# Patient Record
Sex: FEMALE | Race: BLACK OR AFRICAN AMERICAN | NOT HISPANIC OR LATINO | URBAN - METROPOLITAN AREA
[De-identification: names, ages, dates, MRNs, and addresses within clinical notes are randomized per-mention and may not be internally consistent; named-entity substitution may affect disease eponyms.]

---

## 2020-07-25 ENCOUNTER — TELEPHONE ENCOUNTER (OUTPATIENT)
Dept: URBAN - METROPOLITAN AREA CLINIC 13 | Facility: CLINIC | Age: 50
End: 2020-07-25

## 2020-07-26 ENCOUNTER — TELEPHONE ENCOUNTER (OUTPATIENT)
Dept: URBAN - METROPOLITAN AREA CLINIC 13 | Facility: CLINIC | Age: 50
End: 2020-07-26

## 2020-07-26 RX ORDER — METFORMIN HYDROCHLORIDE 500 MG/1
TABLET, COATED ORAL
Qty: 120 | Refills: 0 | Status: ACTIVE | COMMUNITY
Start: 2019-01-07

## 2020-07-26 RX ORDER — NYSTATIN 100000 [USP'U]/ML
SWISH AND SWALLOW 10 TO 15 ML PO QID PRN SUSPENSION ORAL
Qty: 450 | Refills: 0 | Status: ACTIVE | COMMUNITY
Start: 2018-01-29

## 2020-07-26 RX ORDER — HYDROCODONE BITARTRATE AND ACETAMINOPHEN 5; 325 MG/1; MG/1
TAKE 1 TABLET BY MOUTH EVERY 6 HOURS AS NEEDED FOR PAIN TABLET ORAL
Qty: 20 | Refills: 0 | Status: ACTIVE | COMMUNITY
Start: 2018-07-27

## 2020-07-26 RX ORDER — VENLAFAXINE HYDROCHLORIDE 37.5 MG/1
CAPSULE, EXTENDED RELEASE ORAL
Qty: 30 | Refills: 0 | Status: ACTIVE | COMMUNITY
Start: 2013-11-01

## 2020-07-26 RX ORDER — HYDROCHLOROTHIAZIDE 25 MG/1
TABLET ORAL
Qty: 90 | Refills: 0 | Status: ACTIVE | COMMUNITY
Start: 2013-01-29

## 2020-07-26 RX ORDER — HYDROCHLOROTHIAZIDE 25 MG/1
TAKE 1 TABLET BY MOUTH DAILY TABLET ORAL
Qty: 90 | Refills: 0 | Status: ACTIVE | COMMUNITY
Start: 2018-06-22

## 2020-07-26 RX ORDER — ONDANSETRON HYDROCHLORIDE 4 MG/1
TK 1 T PO EVERY 8 HOURS PRN FOR NAUSEA TABLET, FILM COATED ORAL
Qty: 20 | Refills: 0 | Status: ACTIVE | COMMUNITY
Start: 2018-11-28

## 2020-07-26 RX ORDER — CYCLOBENZAPRINE HYDROCHLORIDE 5 MG/1
TAKE 1 TABLET BY MOUTH TWICE DAILY, AS NEEDED TABLET, FILM COATED ORAL
Qty: 28 | Refills: 0 | Status: ACTIVE | COMMUNITY
Start: 2018-07-23

## 2020-07-26 RX ORDER — BUPROPION HYDROCHLORIDE 300 MG/1
TABLET, EXTENDED RELEASE ORAL
Qty: 30 | Refills: 0 | Status: ACTIVE | COMMUNITY
Start: 2018-09-17

## 2020-07-26 RX ORDER — METFORMIN HYDROCHLORIDE 500 MG/1
TABLET, EXTENDED RELEASE ORAL
Qty: 255 | Refills: 0 | Status: ACTIVE | COMMUNITY
Start: 2012-07-20

## 2020-07-26 RX ORDER — OMEPRAZOLE 40 MG/1
TAKE 1 TABLET DAILY CAPSULE, DELAYED RELEASE ORAL
Qty: 30 | Refills: 11 | Status: ACTIVE | COMMUNITY
Start: 2018-06-22

## 2020-07-26 RX ORDER — BUPROPION HYDROCHLORIDE 150 MG/1
TK 1 T PO QAM FOR 7 DAYS TABLET, FILM COATED, EXTENDED RELEASE ORAL
Qty: 7 | Refills: 0 | Status: ACTIVE | COMMUNITY
Start: 2018-09-17

## 2020-07-26 RX ORDER — VENLAFAXINE HYDROCHLORIDE 75 MG/1
CAPSULE, EXTENDED RELEASE ORAL
Qty: 30 | Refills: 0 | Status: ACTIVE | COMMUNITY
Start: 2013-11-14

## 2020-07-26 RX ORDER — BUPROPION HYDROCHLORIDE 150 MG/1
TABLET, EXTENDED RELEASE ORAL
Qty: 90 | Refills: 0 | Status: ACTIVE | COMMUNITY
Start: 2013-01-29

## 2020-07-26 RX ORDER — POLYETHYLENE GLYCOL 3350, SODIUM CHLORIDE, SODIUM BICARBONATE AND POTASSIUM CHLORIDE WITH LEMON FLAVOR 420; 11.2; 5.72; 1.48 G/4L; G/4L; G/4L; G/4L
USE AS DIRECTED POWDER, FOR SOLUTION ORAL
Qty: 1 | Refills: 0 | Status: ACTIVE | COMMUNITY
Start: 2019-01-17

## 2020-07-26 RX ORDER — METFORMIN HYDROCHLORIDE 1000 MG/1
TAKE 1 TABLET EVERY 12 HOURS DAILY TABLET, COATED ORAL
Refills: 0 | Status: ACTIVE | COMMUNITY
Start: 2018-08-01

## 2020-07-26 RX ORDER — FLUCONAZOLE 150 MG/1
TABLET ORAL
Qty: 2 | Refills: 0 | Status: ACTIVE | COMMUNITY
Start: 2013-08-29

## 2020-07-26 RX ORDER — CEFUROXIME AXETIL 500 MG/1
TABLET, FILM COATED ORAL
Qty: 20 | Refills: 0 | Status: ACTIVE | COMMUNITY
Start: 2013-10-16

## 2020-07-26 RX ORDER — GABAPENTIN 300 MG/1
TAKE 2 CAPSULES BY MOUTH EVERY MORNING AND 2 CAPSULES AT BEDTIME CAPSULE ORAL
Qty: 360 | Refills: 0 | Status: ACTIVE | COMMUNITY
Start: 2018-05-08

## 2020-07-26 RX ORDER — CYCLOBENZAPRINE HYDROCHLORIDE 10 MG/1
TABLET, FILM COATED ORAL
Qty: 30 | Refills: 0 | Status: ACTIVE | COMMUNITY
Start: 2019-01-17

## 2020-07-26 RX ORDER — BENZONATATE 200 MG/1
CAPSULE ORAL
Qty: 30 | Refills: 0 | Status: ACTIVE | COMMUNITY
Start: 2013-10-16

## 2020-07-26 RX ORDER — LAMOTRIGINE 100 MG/1
TAKE 1 TABLET DAILY TABLET ORAL
Refills: 0 | Status: ACTIVE | COMMUNITY
Start: 2018-03-08

## 2020-07-26 RX ORDER — OMEPRAZOLE 20 MG/1
CAPSULE, DELAYED RELEASE ORAL
Qty: 180 | Refills: 0 | Status: ACTIVE | COMMUNITY
Start: 2019-01-12

## 2020-07-26 RX ORDER — PREDNISONE 20 MG/1
TAKE 3 TABLETS BY MOUTH EVERY DAY FOR 5 DAYS TABLET ORAL
Qty: 15 | Refills: 0 | Status: ACTIVE | COMMUNITY
Start: 2018-07-27

## 2020-07-26 RX ORDER — AZITHROMYCIN DIHYDRATE 250 MG/1
TAKE 2 TABLETS BY MOUTH TODAY, THEN TAKE 1 TABLET DAILY FOR 4 DAYS TABLET, FILM COATED ORAL
Qty: 6 | Refills: 0 | Status: ACTIVE | COMMUNITY
Start: 2018-06-25

## 2020-07-26 RX ORDER — ESCITALOPRAM 20 MG/1
TABLET, FILM COATED ORAL
Qty: 90 | Refills: 0 | Status: ACTIVE | COMMUNITY
Start: 2013-03-14

## 2020-07-26 RX ORDER — CLINDAMYCIN HYDROCHLORIDE 300 MG/1
TK 1 C PO TID CAPSULE ORAL
Qty: 21 | Refills: 0 | Status: ACTIVE | COMMUNITY
Start: 2018-11-28

## 2020-07-26 RX ORDER — IBUPROFEN 800 MG/1
TAKE 1 TABLET BY MOUTH THREE TIMES A DAY TABLET ORAL
Qty: 21 | Refills: 0 | Status: ACTIVE | COMMUNITY
Start: 2018-07-27

## 2020-07-26 RX ORDER — LISDEXAMFETAMINE DIMESYLATE 70 MG/1
TAKE 1 CAPSULE DAILY IN THE MORNING CAPSULE ORAL
Refills: 0 | Status: ACTIVE | COMMUNITY
Start: 2018-03-08

## 2020-07-26 RX ORDER — GABAPENTIN 300 MG/1
TAKE 2 CAPSULES BY MOUTH EVERY MORNING AND 2 CAPSULES EVERY NIGHT AT B CAPSULE ORAL
Qty: 120 | Refills: 0 | Status: ACTIVE | COMMUNITY
Start: 2018-04-09

## 2020-07-26 RX ORDER — LORAZEPAM 1 MG/1
TABLET ORAL
Qty: 30 | Refills: 0 | Status: ACTIVE | COMMUNITY
Start: 2018-09-17

## 2020-07-26 RX ORDER — CYCLOBENZAPRINE HYDROCHLORIDE 10 MG/1
TAKE 1 TABLET BY MOUTH THREE TIMES A DAY AS NEEDED FOR MUSCLE SPASMS TABLET, FILM COATED ORAL
Qty: 30 | Refills: 0 | Status: ACTIVE | COMMUNITY
Start: 2018-10-16

## 2020-07-26 RX ORDER — DULOXETINE 60 MG/1
TAKE 1 CAPSULE DAILY CAPSULE, DELAYED RELEASE PELLETS ORAL
Refills: 0 | Status: ACTIVE | COMMUNITY
Start: 2018-04-04

## 2020-07-26 RX ORDER — OXYCODONE AND ACETAMINOPHEN 5; 325 MG/1; MG/1
TK 1 TS PO Q 6 H PRN P TABLET ORAL
Qty: 30 | Refills: 0 | Status: ACTIVE | COMMUNITY
Start: 2018-11-28

## 2020-07-26 RX ORDER — METFORMIN HYDROCHLORIDE 500 MG/1
TABLET, COATED ORAL
Qty: 360 | Refills: 0 | Status: ACTIVE | COMMUNITY
Start: 2019-02-13

## 2020-07-26 RX ORDER — DOCUSATE SODIUM 100 MG/1
TK 1 C PO BID PRN CAPSULE, LIQUID FILLED ORAL
Qty: 20 | Refills: 0 | Status: ACTIVE | COMMUNITY
Start: 2018-11-28

## 2020-07-26 RX ORDER — OMEPRAZOLE 20 MG/1
CAPSULE, DELAYED RELEASE ORAL
Qty: 180 | Refills: 0 | Status: ACTIVE | COMMUNITY
Start: 2012-07-20

## 2023-08-15 ENCOUNTER — OFFICE VISIT (OUTPATIENT)
Dept: URBAN - METROPOLITAN AREA CLINIC 113 | Facility: CLINIC | Age: 53
End: 2023-08-15
Payer: COMMERCIAL

## 2023-08-15 ENCOUNTER — LAB OUTSIDE AN ENCOUNTER (OUTPATIENT)
Dept: URBAN - METROPOLITAN AREA CLINIC 113 | Facility: CLINIC | Age: 53
End: 2023-08-15

## 2023-08-15 VITALS
RESPIRATION RATE: 20 BRPM | HEART RATE: 99 BPM | WEIGHT: 247.6 LBS | BODY MASS INDEX: 37.53 KG/M2 | HEIGHT: 68 IN | SYSTOLIC BLOOD PRESSURE: 131 MMHG | DIASTOLIC BLOOD PRESSURE: 94 MMHG | TEMPERATURE: 97.3 F

## 2023-08-15 DIAGNOSIS — K59.01 CONSTIPATION BY DELAYED COLONIC TRANSIT: ICD-10-CM

## 2023-08-15 DIAGNOSIS — R13.19 ESOPHAGEAL DYSPHAGIA: ICD-10-CM

## 2023-08-15 DIAGNOSIS — R63.0 ANOREXIA: ICD-10-CM

## 2023-08-15 PROBLEM — 79890006: Status: ACTIVE | Noted: 2023-08-15

## 2023-08-15 PROBLEM — 35298007: Status: ACTIVE | Noted: 2023-08-15

## 2023-08-15 PROCEDURE — 99204 OFFICE O/P NEW MOD 45 MIN: CPT | Performed by: NURSE PRACTITIONER

## 2023-08-15 RX ORDER — CYCLOBENZAPRINE HYDROCHLORIDE 10 MG/1
TAKE 1 TABLET BY MOUTH THREE TIMES A DAY AS NEEDED FOR MUSCLE SPASMS TABLET, FILM COATED ORAL
Qty: 30 | Refills: 0 | Status: ON HOLD | COMMUNITY
Start: 2018-10-16

## 2023-08-15 RX ORDER — BENZONATATE 200 MG/1
CAPSULE ORAL
Qty: 30 | Refills: 0 | Status: ON HOLD | COMMUNITY
Start: 2013-10-16

## 2023-08-15 RX ORDER — LISDEXAMFETAMINE DIMESYLATE 70 MG/1
TAKE 1 CAPSULE DAILY IN THE MORNING CAPSULE ORAL
Refills: 0 | Status: ON HOLD | COMMUNITY
Start: 2018-03-08

## 2023-08-15 RX ORDER — DOCUSATE SODIUM 100 MG/1
TK 1 C PO BID PRN CAPSULE, LIQUID FILLED ORAL
Qty: 20 | Refills: 0 | Status: ON HOLD | COMMUNITY
Start: 2018-11-28

## 2023-08-15 RX ORDER — ATOMOXETINE 40 MG/1
1 CAPSULE IN THE MORNING CAPSULE ORAL ONCE A DAY
Status: ACTIVE | COMMUNITY

## 2023-08-15 RX ORDER — DULOXETINE 60 MG/1
TAKE 1 CAPSULE DAILY CAPSULE, DELAYED RELEASE PELLETS ORAL
Refills: 0 | Status: ON HOLD | COMMUNITY
Start: 2018-04-04

## 2023-08-15 RX ORDER — GABAPENTIN 300 MG/1
TAKE 2 CAPSULES BY MOUTH EVERY MORNING AND 2 CAPSULES EVERY NIGHT AT B CAPSULE ORAL
Qty: 120 | Refills: 0 | Status: ON HOLD | COMMUNITY
Start: 2018-04-09

## 2023-08-15 RX ORDER — CYCLOBENZAPRINE HYDROCHLORIDE 5 MG/1
TAKE 1 TABLET BY MOUTH TWICE DAILY, AS NEEDED TABLET, FILM COATED ORAL
Qty: 28 | Refills: 0 | Status: ON HOLD | COMMUNITY
Start: 2018-07-23

## 2023-08-15 RX ORDER — POLYETHYLENE GLYCOL 3350, SODIUM CHLORIDE, SODIUM BICARBONATE AND POTASSIUM CHLORIDE WITH LEMON FLAVOR 420; 11.2; 5.72; 1.48 G/4L; G/4L; G/4L; G/4L
USE AS DIRECTED POWDER, FOR SOLUTION ORAL
Qty: 1 | Refills: 0 | Status: ON HOLD | COMMUNITY
Start: 2019-01-17

## 2023-08-15 RX ORDER — BUPROPION HYDROCHLORIDE 150 MG/1
TABLET, EXTENDED RELEASE ORAL
Qty: 90 | Refills: 0 | Status: ACTIVE | COMMUNITY
Start: 2013-01-29

## 2023-08-15 RX ORDER — METFORMIN HYDROCHLORIDE 1000 MG/1
TAKE 1 TABLET EVERY 12 HOURS DAILY TABLET, COATED ORAL
Refills: 0 | Status: ACTIVE | COMMUNITY
Start: 2018-08-01

## 2023-08-15 RX ORDER — AZITHROMYCIN DIHYDRATE 250 MG/1
TAKE 2 TABLETS BY MOUTH TODAY, THEN TAKE 1 TABLET DAILY FOR 4 DAYS TABLET, FILM COATED ORAL
Qty: 6 | Refills: 0 | Status: ON HOLD | COMMUNITY
Start: 2018-06-25

## 2023-08-15 RX ORDER — HYDROCODONE BITARTRATE AND ACETAMINOPHEN 5; 325 MG/1; MG/1
TAKE 1 TABLET BY MOUTH EVERY 6 HOURS AS NEEDED FOR PAIN TABLET ORAL
Qty: 20 | Refills: 0 | Status: ON HOLD | COMMUNITY
Start: 2018-07-27

## 2023-08-15 RX ORDER — CEFUROXIME AXETIL 500 MG/1
TABLET, FILM COATED ORAL
Qty: 20 | Refills: 0 | Status: ON HOLD | COMMUNITY
Start: 2013-10-16

## 2023-08-15 RX ORDER — VENLAFAXINE HYDROCHLORIDE 37.5 MG/1
CAPSULE, EXTENDED RELEASE ORAL
Qty: 30 | Refills: 0 | Status: ON HOLD | COMMUNITY
Start: 2013-11-01

## 2023-08-15 RX ORDER — PREDNISONE 20 MG/1
TAKE 3 TABLETS BY MOUTH EVERY DAY FOR 5 DAYS TABLET ORAL
Qty: 15 | Refills: 0 | Status: ON HOLD | COMMUNITY
Start: 2018-07-27

## 2023-08-15 RX ORDER — NYSTATIN 100000 [USP'U]/ML
SWISH AND SWALLOW 10 TO 15 ML PO QID PRN SUSPENSION ORAL
Qty: 450 | Refills: 0 | Status: ON HOLD | COMMUNITY
Start: 2018-01-29

## 2023-08-15 RX ORDER — ESCITALOPRAM 20 MG/1
TABLET, FILM COATED ORAL
Qty: 90 | Refills: 0 | Status: ON HOLD | COMMUNITY
Start: 2013-03-14

## 2023-08-15 RX ORDER — ONDANSETRON HYDROCHLORIDE 4 MG/1
TK 1 T PO EVERY 8 HOURS PRN FOR NAUSEA TABLET, FILM COATED ORAL
Qty: 20 | Refills: 0 | Status: ON HOLD | COMMUNITY
Start: 2018-11-28

## 2023-08-15 RX ORDER — FLUCONAZOLE 150 MG/1
TABLET ORAL
Qty: 2 | Refills: 0 | Status: ON HOLD | COMMUNITY
Start: 2013-08-29

## 2023-08-15 RX ORDER — HYDROCHLOROTHIAZIDE 25 MG/1
TABLET ORAL
Qty: 90 | Refills: 0 | Status: ACTIVE | COMMUNITY
Start: 2013-01-29

## 2023-08-15 RX ORDER — LORAZEPAM 1 MG/1
TABLET ORAL
Qty: 30 | Refills: 0 | Status: ON HOLD | COMMUNITY
Start: 2018-09-17

## 2023-08-15 RX ORDER — BUPROPION HYDROCHLORIDE 300 MG/1
TABLET, EXTENDED RELEASE ORAL
Qty: 30 | Refills: 0 | Status: ON HOLD | COMMUNITY
Start: 2018-09-17

## 2023-08-15 RX ORDER — OMEPRAZOLE 20 MG/1
CAPSULE, DELAYED RELEASE ORAL
Qty: 180 | Refills: 0 | Status: ON HOLD | COMMUNITY
Start: 2012-07-20

## 2023-08-15 RX ORDER — OXYCODONE AND ACETAMINOPHEN 5; 325 MG/1; MG/1
TK 1 TS PO Q 6 H PRN P TABLET ORAL
Qty: 30 | Refills: 0 | Status: ON HOLD | COMMUNITY
Start: 2018-11-28

## 2023-08-15 RX ORDER — METFORMIN HYDROCHLORIDE 500 MG/1
TABLET, COATED ORAL
Qty: 120 | Refills: 0 | Status: ON HOLD | COMMUNITY
Start: 2019-01-07

## 2023-08-15 RX ORDER — VENLAFAXINE HYDROCHLORIDE 75 MG/1
CAPSULE, EXTENDED RELEASE ORAL
Qty: 30 | Refills: 0 | Status: ON HOLD | COMMUNITY
Start: 2013-11-14

## 2023-08-15 RX ORDER — CLINDAMYCIN HYDROCHLORIDE 300 MG/1
TK 1 C PO TID CAPSULE ORAL
Qty: 21 | Refills: 0 | Status: ON HOLD | COMMUNITY
Start: 2018-11-28

## 2023-08-15 RX ORDER — CLONIDINE HYDROCHLORIDE 0.3 MG/1
1 TABLET TABLET ORAL ONCE A DAY
Status: ACTIVE | COMMUNITY

## 2023-08-15 RX ORDER — OMEPRAZOLE 40 MG/1
TAKE 1 TABLET DAILY CAPSULE, DELAYED RELEASE ORAL
Qty: 30 | Refills: 11 | Status: ACTIVE | COMMUNITY
Start: 2018-06-22

## 2023-08-15 RX ORDER — BUPROPION HYDROCHLORIDE 150 MG/1
TK 1 T PO QAM FOR 7 DAYS TABLET, FILM COATED, EXTENDED RELEASE ORAL
Qty: 7 | Refills: 0 | Status: ON HOLD | COMMUNITY
Start: 2018-09-17

## 2023-08-15 RX ORDER — POTASSIUM CHLORIDE 10 MEQ/1
1 CAPSULE WITH FOOD CAPSULE, COATED, EXTENDED RELEASE ORAL TWICE A DAY
Status: ACTIVE | COMMUNITY

## 2023-08-15 RX ORDER — METFORMIN HYDROCHLORIDE 500 MG/1
TABLET, EXTENDED RELEASE ORAL
Qty: 255 | Refills: 0 | Status: ON HOLD | COMMUNITY
Start: 2012-07-20

## 2023-08-15 RX ORDER — IBUPROFEN 800 MG/1
TAKE 1 TABLET BY MOUTH THREE TIMES A DAY TABLET ORAL
Qty: 21 | Refills: 0 | Status: ON HOLD | COMMUNITY
Start: 2018-07-27

## 2023-08-15 RX ORDER — LAMOTRIGINE 100 MG/1
TAKE 1 TABLET DAILY TABLET ORAL
Refills: 0 | Status: ACTIVE | COMMUNITY
Start: 2018-03-08

## 2023-08-15 NOTE — HPI-TODAY'S VISIT:
51 yo woman referred by Ricardo Chaudhry NP for evaluation of GERD. A copy of today's visit will be forwarded to the referring provider. She has a past medical history of DM with polyneuropathy, HTN, depression, obesity, dysphagia and anorexia.  She was seen in the office in January 2019 for evaluation of anemia, as well as esophageal dysphagia. She was recommended a colonoscopy with consideration of EGD. Workup was not completed.  Barium swallow 6/7/23: normal  She has been experiencing a loss of appetite for some time, that started sometime last year. She thinks that her anorexia started with depression. She was working from home at the time, and felt socially isolated. She would experience a gagging sensation when she would go to swallow. This is improving. There is not any obstructive symptoms. There is no heartburn. There is no nausea or vomiting. She does not have any abdominal pain. She has bowel movements once, maybe twice per week. She is not on a bowel regimen. No melena or red blood per rectum.  She is up to date on colon cancer screening, having done Cologuard in early July.

## 2023-09-01 ENCOUNTER — TELEPHONE ENCOUNTER (OUTPATIENT)
Dept: URBAN - METROPOLITAN AREA CLINIC 113 | Facility: CLINIC | Age: 53
End: 2023-09-01

## 2023-09-01 ENCOUNTER — OFFICE VISIT (OUTPATIENT)
Dept: URBAN - METROPOLITAN AREA MEDICAL CENTER 2 | Facility: MEDICAL CENTER | Age: 53
End: 2023-09-01
Payer: COMMERCIAL

## 2023-09-01 DIAGNOSIS — R63.0 ALMOST NO APPETITE: ICD-10-CM

## 2023-09-01 DIAGNOSIS — R13.10 ABNORMAL DEGLUTITION: ICD-10-CM

## 2023-09-01 DIAGNOSIS — R68.81 EARLY SATIETY: ICD-10-CM

## 2023-09-01 PROCEDURE — 43235 EGD DIAGNOSTIC BRUSH WASH: CPT | Performed by: INTERNAL MEDICINE

## 2023-09-01 RX ORDER — CLINDAMYCIN HYDROCHLORIDE 300 MG/1
TK 1 C PO TID CAPSULE ORAL
Qty: 21 | Refills: 0 | Status: ON HOLD | COMMUNITY
Start: 2018-11-28

## 2023-09-01 RX ORDER — VENLAFAXINE HYDROCHLORIDE 75 MG/1
CAPSULE, EXTENDED RELEASE ORAL
Qty: 30 | Refills: 0 | Status: ON HOLD | COMMUNITY
Start: 2013-11-14

## 2023-09-01 RX ORDER — CYCLOBENZAPRINE HYDROCHLORIDE 5 MG/1
TAKE 1 TABLET BY MOUTH TWICE DAILY, AS NEEDED TABLET, FILM COATED ORAL
Qty: 28 | Refills: 0 | Status: ON HOLD | COMMUNITY
Start: 2018-07-23

## 2023-09-01 RX ORDER — HYDROCODONE BITARTRATE AND ACETAMINOPHEN 5; 325 MG/1; MG/1
TAKE 1 TABLET BY MOUTH EVERY 6 HOURS AS NEEDED FOR PAIN TABLET ORAL
Qty: 20 | Refills: 0 | Status: ON HOLD | COMMUNITY
Start: 2018-07-27

## 2023-09-01 RX ORDER — ESCITALOPRAM 20 MG/1
TABLET, FILM COATED ORAL
Qty: 90 | Refills: 0 | Status: ON HOLD | COMMUNITY
Start: 2013-03-14

## 2023-09-01 RX ORDER — DOCUSATE SODIUM 100 MG/1
TK 1 C PO BID PRN CAPSULE, LIQUID FILLED ORAL
Qty: 20 | Refills: 0 | Status: ON HOLD | COMMUNITY
Start: 2018-11-28

## 2023-09-01 RX ORDER — METFORMIN HYDROCHLORIDE 500 MG/1
TABLET, COATED ORAL
Qty: 120 | Refills: 0 | Status: ON HOLD | COMMUNITY
Start: 2019-01-07

## 2023-09-01 RX ORDER — HYDROCHLOROTHIAZIDE 25 MG/1
TABLET ORAL
Qty: 90 | Refills: 0 | Status: ACTIVE | COMMUNITY
Start: 2013-01-29

## 2023-09-01 RX ORDER — VENLAFAXINE HYDROCHLORIDE 37.5 MG/1
CAPSULE, EXTENDED RELEASE ORAL
Qty: 30 | Refills: 0 | Status: ON HOLD | COMMUNITY
Start: 2013-11-01

## 2023-09-01 RX ORDER — CYCLOBENZAPRINE HYDROCHLORIDE 10 MG/1
TAKE 1 TABLET BY MOUTH THREE TIMES A DAY AS NEEDED FOR MUSCLE SPASMS TABLET, FILM COATED ORAL
Qty: 30 | Refills: 0 | Status: ON HOLD | COMMUNITY
Start: 2018-10-16

## 2023-09-01 RX ORDER — DULOXETINE 60 MG/1
TAKE 1 CAPSULE DAILY CAPSULE, DELAYED RELEASE PELLETS ORAL
Refills: 0 | Status: ON HOLD | COMMUNITY
Start: 2018-04-04

## 2023-09-01 RX ORDER — FLUCONAZOLE 150 MG/1
TABLET ORAL
Qty: 2 | Refills: 0 | Status: ON HOLD | COMMUNITY
Start: 2013-08-29

## 2023-09-01 RX ORDER — METFORMIN HYDROCHLORIDE 500 MG/1
TABLET, EXTENDED RELEASE ORAL
Qty: 255 | Refills: 0 | Status: ON HOLD | COMMUNITY
Start: 2012-07-20

## 2023-09-01 RX ORDER — IBUPROFEN 800 MG/1
TAKE 1 TABLET BY MOUTH THREE TIMES A DAY TABLET ORAL
Qty: 21 | Refills: 0 | Status: ON HOLD | COMMUNITY
Start: 2018-07-27

## 2023-09-01 RX ORDER — METFORMIN HYDROCHLORIDE 1000 MG/1
TAKE 1 TABLET EVERY 12 HOURS DAILY TABLET, COATED ORAL
Refills: 0 | Status: ACTIVE | COMMUNITY
Start: 2018-08-01

## 2023-09-01 RX ORDER — POLYETHYLENE GLYCOL 3350, SODIUM CHLORIDE, SODIUM BICARBONATE AND POTASSIUM CHLORIDE WITH LEMON FLAVOR 420; 11.2; 5.72; 1.48 G/4L; G/4L; G/4L; G/4L
USE AS DIRECTED POWDER, FOR SOLUTION ORAL
Qty: 1 | Refills: 0 | Status: ON HOLD | COMMUNITY
Start: 2019-01-17

## 2023-09-01 RX ORDER — CEFUROXIME AXETIL 500 MG/1
TABLET, FILM COATED ORAL
Qty: 20 | Refills: 0 | Status: ON HOLD | COMMUNITY
Start: 2013-10-16

## 2023-09-01 RX ORDER — BUPROPION HYDROCHLORIDE 150 MG/1
TABLET, EXTENDED RELEASE ORAL
Qty: 90 | Refills: 0 | Status: ACTIVE | COMMUNITY
Start: 2013-01-29

## 2023-09-01 RX ORDER — LORAZEPAM 1 MG/1
TABLET ORAL
Qty: 30 | Refills: 0 | Status: ON HOLD | COMMUNITY
Start: 2018-09-17

## 2023-09-01 RX ORDER — POTASSIUM CHLORIDE 10 MEQ/1
1 CAPSULE WITH FOOD CAPSULE, COATED, EXTENDED RELEASE ORAL TWICE A DAY
Status: ACTIVE | COMMUNITY

## 2023-09-01 RX ORDER — AZITHROMYCIN DIHYDRATE 250 MG/1
TAKE 2 TABLETS BY MOUTH TODAY, THEN TAKE 1 TABLET DAILY FOR 4 DAYS TABLET, FILM COATED ORAL
Qty: 6 | Refills: 0 | Status: ON HOLD | COMMUNITY
Start: 2018-06-25

## 2023-09-01 RX ORDER — NYSTATIN 100000 [USP'U]/ML
SWISH AND SWALLOW 10 TO 15 ML PO QID PRN SUSPENSION ORAL
Qty: 450 | Refills: 0 | Status: ON HOLD | COMMUNITY
Start: 2018-01-29

## 2023-09-01 RX ORDER — LAMOTRIGINE 100 MG/1
TAKE 1 TABLET DAILY TABLET ORAL
Refills: 0 | Status: ACTIVE | COMMUNITY
Start: 2018-03-08

## 2023-09-01 RX ORDER — OMEPRAZOLE 20 MG/1
CAPSULE, DELAYED RELEASE ORAL
Qty: 180 | Refills: 0 | Status: ON HOLD | COMMUNITY
Start: 2012-07-20

## 2023-09-01 RX ORDER — PREDNISONE 20 MG/1
TAKE 3 TABLETS BY MOUTH EVERY DAY FOR 5 DAYS TABLET ORAL
Qty: 15 | Refills: 0 | Status: ON HOLD | COMMUNITY
Start: 2018-07-27

## 2023-09-01 RX ORDER — BENZONATATE 200 MG/1
CAPSULE ORAL
Qty: 30 | Refills: 0 | Status: ON HOLD | COMMUNITY
Start: 2013-10-16

## 2023-09-01 RX ORDER — GABAPENTIN 300 MG/1
TAKE 2 CAPSULES BY MOUTH EVERY MORNING AND 2 CAPSULES EVERY NIGHT AT B CAPSULE ORAL
Qty: 120 | Refills: 0 | Status: ON HOLD | COMMUNITY
Start: 2018-04-09

## 2023-09-01 RX ORDER — OXYCODONE AND ACETAMINOPHEN 5; 325 MG/1; MG/1
TK 1 TS PO Q 6 H PRN P TABLET ORAL
Qty: 30 | Refills: 0 | Status: ON HOLD | COMMUNITY
Start: 2018-11-28

## 2023-09-01 RX ORDER — OMEPRAZOLE 40 MG/1
TAKE 1 TABLET DAILY CAPSULE, DELAYED RELEASE ORAL
Qty: 30 | Refills: 11 | Status: ACTIVE | COMMUNITY
Start: 2018-06-22

## 2023-09-01 RX ORDER — BUPROPION HYDROCHLORIDE 300 MG/1
TABLET, EXTENDED RELEASE ORAL
Qty: 30 | Refills: 0 | Status: ON HOLD | COMMUNITY
Start: 2018-09-17

## 2023-09-01 RX ORDER — LISDEXAMFETAMINE DIMESYLATE 70 MG/1
TAKE 1 CAPSULE DAILY IN THE MORNING CAPSULE ORAL
Refills: 0 | Status: ON HOLD | COMMUNITY
Start: 2018-03-08

## 2023-09-01 RX ORDER — CLONIDINE HYDROCHLORIDE 0.3 MG/1
1 TABLET TABLET ORAL ONCE A DAY
Status: ACTIVE | COMMUNITY

## 2023-09-01 RX ORDER — ATOMOXETINE 40 MG/1
1 CAPSULE IN THE MORNING CAPSULE ORAL ONCE A DAY
Status: ACTIVE | COMMUNITY

## 2023-09-01 RX ORDER — ONDANSETRON HYDROCHLORIDE 4 MG/1
TK 1 T PO EVERY 8 HOURS PRN FOR NAUSEA TABLET, FILM COATED ORAL
Qty: 20 | Refills: 0 | Status: ON HOLD | COMMUNITY
Start: 2018-11-28

## 2023-09-01 RX ORDER — BUPROPION HYDROCHLORIDE 150 MG/1
TK 1 T PO QAM FOR 7 DAYS TABLET, FILM COATED, EXTENDED RELEASE ORAL
Qty: 7 | Refills: 0 | Status: ON HOLD | COMMUNITY
Start: 2018-09-17

## 2023-10-06 ENCOUNTER — OFFICE VISIT (OUTPATIENT)
Dept: URBAN - METROPOLITAN AREA MEDICAL CENTER 2 | Facility: MEDICAL CENTER | Age: 53
End: 2023-10-06
Payer: COMMERCIAL

## 2023-10-06 DIAGNOSIS — K31.89 ACHYLIA: ICD-10-CM

## 2023-10-06 DIAGNOSIS — R63.0 ALMOST NO APPETITE: ICD-10-CM

## 2023-10-06 DIAGNOSIS — R13.10 ABNORMAL DEGLUTITION: ICD-10-CM

## 2023-10-06 PROCEDURE — 43239 EGD BIOPSY SINGLE/MULTIPLE: CPT | Performed by: INTERNAL MEDICINE

## 2023-10-06 PROCEDURE — 43450 DILATE ESOPHAGUS 1/MULT PASS: CPT | Performed by: INTERNAL MEDICINE

## 2023-10-20 ENCOUNTER — LAB OUTSIDE AN ENCOUNTER (OUTPATIENT)
Dept: URBAN - METROPOLITAN AREA CLINIC 113 | Facility: CLINIC | Age: 53
End: 2023-10-20

## 2023-10-20 ENCOUNTER — OFFICE VISIT (OUTPATIENT)
Dept: URBAN - METROPOLITAN AREA CLINIC 113 | Facility: CLINIC | Age: 53
End: 2023-10-20
Payer: COMMERCIAL

## 2023-10-20 VITALS
HEIGHT: 68 IN | HEART RATE: 108 BPM | SYSTOLIC BLOOD PRESSURE: 106 MMHG | WEIGHT: 230 LBS | TEMPERATURE: 97 F | BODY MASS INDEX: 34.86 KG/M2 | DIASTOLIC BLOOD PRESSURE: 73 MMHG

## 2023-10-20 DIAGNOSIS — R63.4 WEIGHT LOSS: ICD-10-CM

## 2023-10-20 DIAGNOSIS — R63.0 ANOREXIA: ICD-10-CM

## 2023-10-20 DIAGNOSIS — K59.01 CONSTIPATION BY DELAYED COLONIC TRANSIT: ICD-10-CM

## 2023-10-20 DIAGNOSIS — R13.19 ESOPHAGEAL DYSPHAGIA: ICD-10-CM

## 2023-10-20 PROBLEM — 89362005: Status: ACTIVE | Noted: 2023-10-20

## 2023-10-20 PROCEDURE — 99213 OFFICE O/P EST LOW 20 MIN: CPT | Performed by: INTERNAL MEDICINE

## 2023-10-20 RX ORDER — PREDNISONE 20 MG/1
TAKE 3 TABLETS BY MOUTH EVERY DAY FOR 5 DAYS TABLET ORAL
Qty: 15 | Refills: 0 | Status: ON HOLD | COMMUNITY
Start: 2018-07-27

## 2023-10-20 RX ORDER — IBUPROFEN 800 MG/1
TAKE 1 TABLET BY MOUTH THREE TIMES A DAY TABLET ORAL
Qty: 21 | Refills: 0 | Status: ON HOLD | COMMUNITY
Start: 2018-07-27

## 2023-10-20 RX ORDER — CLINDAMYCIN HYDROCHLORIDE 300 MG/1
TK 1 C PO TID CAPSULE ORAL
Qty: 21 | Refills: 0 | Status: ON HOLD | COMMUNITY
Start: 2018-11-28

## 2023-10-20 RX ORDER — FLUCONAZOLE 150 MG/1
TABLET ORAL
Qty: 2 | Refills: 0 | Status: ON HOLD | COMMUNITY
Start: 2013-08-29

## 2023-10-20 RX ORDER — VENLAFAXINE HYDROCHLORIDE 37.5 MG/1
CAPSULE, EXTENDED RELEASE ORAL
Qty: 30 | Refills: 0 | Status: ON HOLD | COMMUNITY
Start: 2013-11-01

## 2023-10-20 RX ORDER — CEFUROXIME AXETIL 500 MG/1
TABLET, FILM COATED ORAL
Qty: 20 | Refills: 0 | Status: ON HOLD | COMMUNITY
Start: 2013-10-16

## 2023-10-20 RX ORDER — ATOMOXETINE 40 MG/1
1 CAPSULE IN THE MORNING CAPSULE ORAL ONCE A DAY
Status: ACTIVE | COMMUNITY

## 2023-10-20 RX ORDER — GABAPENTIN 300 MG/1
TAKE 2 CAPSULES BY MOUTH EVERY MORNING AND 2 CAPSULES EVERY NIGHT AT B CAPSULE ORAL
Qty: 120 | Refills: 0 | Status: ON HOLD | COMMUNITY
Start: 2018-04-09

## 2023-10-20 RX ORDER — NYSTATIN 100000 [USP'U]/ML
SWISH AND SWALLOW 10 TO 15 ML PO QID PRN SUSPENSION ORAL
Qty: 450 | Refills: 0 | Status: ON HOLD | COMMUNITY
Start: 2018-01-29

## 2023-10-20 RX ORDER — AZITHROMYCIN DIHYDRATE 250 MG/1
TAKE 2 TABLETS BY MOUTH TODAY, THEN TAKE 1 TABLET DAILY FOR 4 DAYS TABLET, FILM COATED ORAL
Qty: 6 | Refills: 0 | Status: ON HOLD | COMMUNITY
Start: 2018-06-25

## 2023-10-20 RX ORDER — HYDROCODONE BITARTRATE AND ACETAMINOPHEN 5; 325 MG/1; MG/1
TAKE 1 TABLET BY MOUTH EVERY 6 HOURS AS NEEDED FOR PAIN TABLET ORAL
Qty: 20 | Refills: 0 | Status: ON HOLD | COMMUNITY
Start: 2018-07-27

## 2023-10-20 RX ORDER — METFORMIN HYDROCHLORIDE 1000 MG/1
TAKE 1 TABLET EVERY 12 HOURS DAILY TABLET, COATED ORAL
Refills: 0 | Status: ACTIVE | COMMUNITY
Start: 2018-08-01

## 2023-10-20 RX ORDER — CYCLOBENZAPRINE HYDROCHLORIDE 5 MG/1
TAKE 1 TABLET BY MOUTH TWICE DAILY, AS NEEDED TABLET, FILM COATED ORAL
Qty: 28 | Refills: 0 | Status: ON HOLD | COMMUNITY
Start: 2018-07-23

## 2023-10-20 RX ORDER — ESCITALOPRAM 20 MG/1
TABLET, FILM COATED ORAL
Qty: 90 | Refills: 0 | Status: ON HOLD | COMMUNITY
Start: 2013-03-14

## 2023-10-20 RX ORDER — BUPROPION HYDROCHLORIDE 150 MG/1
TK 1 T PO QAM FOR 7 DAYS TABLET, FILM COATED, EXTENDED RELEASE ORAL
Qty: 7 | Refills: 0 | Status: ON HOLD | COMMUNITY
Start: 2018-09-17

## 2023-10-20 RX ORDER — DOCUSATE SODIUM 100 MG/1
TK 1 C PO BID PRN CAPSULE, LIQUID FILLED ORAL
Qty: 20 | Refills: 0 | Status: ON HOLD | COMMUNITY
Start: 2018-11-28

## 2023-10-20 RX ORDER — CYCLOBENZAPRINE HYDROCHLORIDE 10 MG/1
TAKE 1 TABLET BY MOUTH THREE TIMES A DAY AS NEEDED FOR MUSCLE SPASMS TABLET, FILM COATED ORAL
Qty: 30 | Refills: 0 | Status: ON HOLD | COMMUNITY
Start: 2018-10-16

## 2023-10-20 RX ORDER — OMEPRAZOLE 20 MG/1
CAPSULE, DELAYED RELEASE ORAL
Qty: 180 | Refills: 0 | Status: ON HOLD | COMMUNITY
Start: 2012-07-20

## 2023-10-20 RX ORDER — POLYETHYLENE GLYCOL 3350, SODIUM CHLORIDE, SODIUM BICARBONATE AND POTASSIUM CHLORIDE WITH LEMON FLAVOR 420; 11.2; 5.72; 1.48 G/4L; G/4L; G/4L; G/4L
USE AS DIRECTED POWDER, FOR SOLUTION ORAL
Qty: 1 | Refills: 0 | Status: ON HOLD | COMMUNITY
Start: 2019-01-17

## 2023-10-20 RX ORDER — METFORMIN HYDROCHLORIDE 500 MG/1
TABLET, COATED ORAL
Qty: 120 | Refills: 0 | Status: ON HOLD | COMMUNITY
Start: 2019-01-07

## 2023-10-20 RX ORDER — DULOXETINE 60 MG/1
TAKE 1 CAPSULE DAILY CAPSULE, DELAYED RELEASE PELLETS ORAL
Refills: 0 | Status: ON HOLD | COMMUNITY
Start: 2018-04-04

## 2023-10-20 RX ORDER — POTASSIUM CHLORIDE 10 MEQ/1
1 CAPSULE WITH FOOD CAPSULE, COATED, EXTENDED RELEASE ORAL TWICE A DAY
Status: ACTIVE | COMMUNITY

## 2023-10-20 RX ORDER — OXYCODONE AND ACETAMINOPHEN 5; 325 MG/1; MG/1
TK 1 TS PO Q 6 H PRN P TABLET ORAL
Qty: 30 | Refills: 0 | Status: ON HOLD | COMMUNITY
Start: 2018-11-28

## 2023-10-20 RX ORDER — BUPROPION HYDROCHLORIDE 300 MG/1
TABLET, EXTENDED RELEASE ORAL
Qty: 30 | Refills: 0 | Status: ON HOLD | COMMUNITY
Start: 2018-09-17

## 2023-10-20 RX ORDER — OMEPRAZOLE 40 MG/1
TAKE 1 TABLET DAILY CAPSULE, DELAYED RELEASE ORAL
Qty: 30 | Refills: 11 | Status: ACTIVE | COMMUNITY
Start: 2018-06-22

## 2023-10-20 RX ORDER — ONDANSETRON HYDROCHLORIDE 4 MG/1
TK 1 T PO EVERY 8 HOURS PRN FOR NAUSEA TABLET, FILM COATED ORAL
Qty: 20 | Refills: 0 | Status: ON HOLD | COMMUNITY
Start: 2018-11-28

## 2023-10-20 RX ORDER — LAMOTRIGINE 100 MG/1
TAKE 1 TABLET DAILY TABLET ORAL
Refills: 0 | Status: ON HOLD | COMMUNITY
Start: 2018-03-08

## 2023-10-20 RX ORDER — LISDEXAMFETAMINE DIMESYLATE 70 MG/1
TAKE 1 CAPSULE DAILY IN THE MORNING CAPSULE ORAL
Refills: 0 | Status: ON HOLD | COMMUNITY
Start: 2018-03-08

## 2023-10-20 RX ORDER — LORAZEPAM 1 MG/1
TABLET ORAL
Qty: 30 | Refills: 0 | Status: ON HOLD | COMMUNITY
Start: 2018-09-17

## 2023-10-20 RX ORDER — CLONIDINE HYDROCHLORIDE 0.3 MG/1
1 TABLET TABLET ORAL ONCE A DAY
Status: ACTIVE | COMMUNITY

## 2023-10-20 RX ORDER — HYDROCHLOROTHIAZIDE 25 MG/1
TABLET ORAL
Qty: 90 | Refills: 0 | Status: ACTIVE | COMMUNITY
Start: 2013-01-29

## 2023-10-20 RX ORDER — VENLAFAXINE HYDROCHLORIDE 75 MG/1
CAPSULE, EXTENDED RELEASE ORAL
Qty: 30 | Refills: 0 | Status: ON HOLD | COMMUNITY
Start: 2013-11-14

## 2023-10-20 RX ORDER — BUPROPION HYDROCHLORIDE 150 MG/1
TABLET, EXTENDED RELEASE ORAL
Qty: 90 | Refills: 0 | Status: ON HOLD | COMMUNITY
Start: 2013-01-29

## 2023-10-20 RX ORDER — BENZONATATE 200 MG/1
CAPSULE ORAL
Qty: 30 | Refills: 0 | Status: ON HOLD | COMMUNITY
Start: 2013-10-16

## 2023-10-20 RX ORDER — METFORMIN HYDROCHLORIDE 500 MG/1
TABLET, EXTENDED RELEASE ORAL
Qty: 255 | Refills: 0 | Status: ON HOLD | COMMUNITY
Start: 2012-07-20

## 2023-10-20 NOTE — HPI-OTHER HISTORIES
EGD on 10/6/2023 revealed a normal esophagus and Z-line at 38 cm.  A 40 Malay Leslie dilation was empirically done.  There is no change.  After dilation and esophageal biopsies were revealed no significant pathologic abnormality.  There is no eosinophils.  There was some erythema of the gastric antrum and body biopsies revealed no significant abnormality negative for H. pylori.  The duodenum was normal.EGD on 9/1/2023 revealed a normal esophagus.  There was a large amount of food in the stomach the exam was aborted.

## 2023-10-20 NOTE — HPI-TODAY'S VISIT:
54 yo woman referred for evaluation of GERD.  She has a past medical history of DM with polyneuropathy, HTN, depression, obesity, dysphagia and anorexia.  Presents after EGD. She was seen in the office in January 2019 for evaluation of anemia, as well as esophageal dysphagia. She was recommended a colonoscopy with consideration of EGD. Workup was not completed.  Barium swallow 6/7/23: normal  She currently denies any dysphagia or heartburn.  There is no abdominal pain.  She continues to have anorexia and continues to lose weight.  She has lost about 15 pounds more since her last visit.  She has a bowel movement once a week there is been no blood or melena.  She denies any nausea or vomiting currently.  She is up to date on colon cancer screening, having done Cologuard in early July.

## 2023-10-23 ENCOUNTER — TELEPHONE ENCOUNTER (OUTPATIENT)
Dept: URBAN - METROPOLITAN AREA CLINIC 113 | Facility: CLINIC | Age: 53
End: 2023-10-23

## 2023-10-23 RX ORDER — DIPHENHYDRAMINE HYDROCHLORIDE 25 MG/1
1 CAPSULE CAPSULE ORAL
Qty: 10 | Refills: 0 | OUTPATIENT
Start: 2023-10-24 | End: 2023-10-28

## 2023-10-23 RX ORDER — PREDNISONE 10 MG/1
1 TABLET TABLET ORAL
Qty: 10 | Refills: 0 | OUTPATIENT
Start: 2023-10-24 | End: 2023-10-28

## 2023-10-29 ENCOUNTER — TELEPHONE ENCOUNTER (OUTPATIENT)
Dept: URBAN - METROPOLITAN AREA CLINIC 113 | Facility: CLINIC | Age: 53
End: 2023-10-29

## 2023-11-08 ENCOUNTER — TELEPHONE ENCOUNTER (OUTPATIENT)
Dept: URBAN - METROPOLITAN AREA CLINIC 113 | Facility: CLINIC | Age: 53
End: 2023-11-08

## 2023-11-09 ENCOUNTER — LAB OUTSIDE AN ENCOUNTER (OUTPATIENT)
Dept: URBAN - METROPOLITAN AREA CLINIC 113 | Facility: CLINIC | Age: 53
End: 2023-11-09

## 2023-11-09 PROBLEM — 116339002: Status: ACTIVE | Noted: 2023-11-09

## 2023-11-09 PROBLEM — 16900311000119102: Status: ACTIVE | Noted: 2023-11-09

## 2023-11-17 ENCOUNTER — LAB OUTSIDE AN ENCOUNTER (OUTPATIENT)
Dept: URBAN - METROPOLITAN AREA CLINIC 113 | Facility: CLINIC | Age: 53
End: 2023-11-17

## 2023-11-17 ENCOUNTER — TELEPHONE ENCOUNTER (OUTPATIENT)
Dept: URBAN - METROPOLITAN AREA CLINIC 113 | Facility: CLINIC | Age: 53
End: 2023-11-17

## 2023-11-17 RX ORDER — POLYETHYLENE GLYCOL 3350, SODIUM CHLORIDE, SODIUM BICARBONATE, POTASSIUM CHLORIDE 420; 11.2; 5.72; 1.48 G/4L; G/4L; G/4L; G/4L
AS DIRECTED POWDER, FOR SOLUTION ORAL ONCE
Qty: 420 GM | Refills: 0 | OUTPATIENT
Start: 2023-11-17 | End: 2023-11-18

## 2023-12-07 ENCOUNTER — TELEPHONE ENCOUNTER (OUTPATIENT)
Dept: URBAN - METROPOLITAN AREA CLINIC 113 | Facility: CLINIC | Age: 53
End: 2023-12-07

## 2023-12-14 ENCOUNTER — OFFICE VISIT (OUTPATIENT)
Dept: URBAN - METROPOLITAN AREA CLINIC 113 | Facility: CLINIC | Age: 53
End: 2023-12-14
Payer: COMMERCIAL

## 2023-12-14 VITALS
WEIGHT: 218 LBS | RESPIRATION RATE: 18 BRPM | HEART RATE: 101 BPM | SYSTOLIC BLOOD PRESSURE: 106 MMHG | BODY MASS INDEX: 33.04 KG/M2 | TEMPERATURE: 97.1 F | DIASTOLIC BLOOD PRESSURE: 79 MMHG | HEIGHT: 68 IN

## 2023-12-14 DIAGNOSIS — R13.19 ESOPHAGEAL DYSPHAGIA: ICD-10-CM

## 2023-12-14 DIAGNOSIS — K59.01 CONSTIPATION BY DELAYED COLONIC TRANSIT: ICD-10-CM

## 2023-12-14 DIAGNOSIS — R63.4 WEIGHT LOSS: ICD-10-CM

## 2023-12-14 DIAGNOSIS — R63.0 ANOREXIA: ICD-10-CM

## 2023-12-14 PROCEDURE — 99213 OFFICE O/P EST LOW 20 MIN: CPT | Performed by: INTERNAL MEDICINE

## 2023-12-14 RX ORDER — ONDANSETRON HYDROCHLORIDE 4 MG/1
TK 1 T PO EVERY 8 HOURS PRN FOR NAUSEA TABLET, FILM COATED ORAL
Qty: 20 | Refills: 0 | Status: ON HOLD | COMMUNITY
Start: 2018-11-28

## 2023-12-14 RX ORDER — GABAPENTIN 300 MG/1
TAKE 2 CAPSULES BY MOUTH EVERY MORNING AND 2 CAPSULES EVERY NIGHT AT B CAPSULE ORAL
Qty: 120 | Refills: 0 | Status: ON HOLD | COMMUNITY
Start: 2018-04-09

## 2023-12-14 RX ORDER — AZITHROMYCIN DIHYDRATE 250 MG/1
TAKE 2 TABLETS BY MOUTH TODAY, THEN TAKE 1 TABLET DAILY FOR 4 DAYS TABLET, FILM COATED ORAL
Qty: 6 | Refills: 0 | Status: ON HOLD | COMMUNITY
Start: 2018-06-25

## 2023-12-14 RX ORDER — LORAZEPAM 1 MG/1
TABLET ORAL
Qty: 30 | Refills: 0 | Status: ON HOLD | COMMUNITY
Start: 2018-09-17

## 2023-12-14 RX ORDER — SERTRALINE 50 MG/1
1 TABLET TABLET, FILM COATED ORAL ONCE A DAY
Status: ACTIVE | COMMUNITY

## 2023-12-14 RX ORDER — NYSTATIN 100000 [USP'U]/ML
SWISH AND SWALLOW 10 TO 15 ML PO QID PRN SUSPENSION ORAL
Qty: 450 | Refills: 0 | Status: ON HOLD | COMMUNITY
Start: 2018-01-29

## 2023-12-14 RX ORDER — OXYCODONE AND ACETAMINOPHEN 5; 325 MG/1; MG/1
TK 1 TS PO Q 6 H PRN P TABLET ORAL
Qty: 30 | Refills: 0 | Status: ON HOLD | COMMUNITY
Start: 2018-11-28

## 2023-12-14 RX ORDER — OMEPRAZOLE 40 MG/1
TAKE 1 TABLET DAILY CAPSULE, DELAYED RELEASE ORAL
Qty: 30 | Refills: 11 | Status: ACTIVE | COMMUNITY
Start: 2018-06-22

## 2023-12-14 RX ORDER — CEFUROXIME AXETIL 500 MG/1
TABLET, FILM COATED ORAL
Qty: 20 | Refills: 0 | Status: ON HOLD | COMMUNITY
Start: 2013-10-16

## 2023-12-14 RX ORDER — PREDNISONE 20 MG/1
TAKE 3 TABLETS BY MOUTH EVERY DAY FOR 5 DAYS TABLET ORAL
Qty: 15 | Refills: 0 | Status: ON HOLD | COMMUNITY
Start: 2018-07-27

## 2023-12-14 RX ORDER — LAMOTRIGINE 100 MG/1
TAKE 1 TABLET DAILY TABLET ORAL
Refills: 0 | Status: ON HOLD | COMMUNITY
Start: 2018-03-08

## 2023-12-14 RX ORDER — FLUCONAZOLE 150 MG/1
TABLET ORAL
Qty: 2 | Refills: 0 | Status: ON HOLD | COMMUNITY
Start: 2013-08-29

## 2023-12-14 RX ORDER — VENLAFAXINE HYDROCHLORIDE 37.5 MG/1
CAPSULE, EXTENDED RELEASE ORAL
Qty: 30 | Refills: 0 | Status: ON HOLD | COMMUNITY
Start: 2013-11-01

## 2023-12-14 RX ORDER — METFORMIN HYDROCHLORIDE 500 MG/1
TABLET, EXTENDED RELEASE ORAL
Qty: 255 | Refills: 0 | Status: ON HOLD | COMMUNITY
Start: 2012-07-20

## 2023-12-14 RX ORDER — VENLAFAXINE HYDROCHLORIDE 75 MG/1
CAPSULE, EXTENDED RELEASE ORAL
Qty: 30 | Refills: 0 | Status: ON HOLD | COMMUNITY
Start: 2013-11-14

## 2023-12-14 RX ORDER — CYCLOBENZAPRINE HYDROCHLORIDE 5 MG/1
TAKE 1 TABLET BY MOUTH TWICE DAILY, AS NEEDED TABLET, FILM COATED ORAL
Qty: 28 | Refills: 0 | Status: ON HOLD | COMMUNITY
Start: 2018-07-23

## 2023-12-14 RX ORDER — BENZONATATE 200 MG/1
CAPSULE ORAL
Qty: 30 | Refills: 0 | Status: ON HOLD | COMMUNITY
Start: 2013-10-16

## 2023-12-14 RX ORDER — METFORMIN HYDROCHLORIDE 500 MG/1
TABLET, COATED ORAL
Qty: 120 | Refills: 0 | Status: ON HOLD | COMMUNITY
Start: 2019-01-07

## 2023-12-14 RX ORDER — POLYETHYLENE GLYCOL 3350, SODIUM CHLORIDE, SODIUM BICARBONATE AND POTASSIUM CHLORIDE WITH LEMON FLAVOR 420; 11.2; 5.72; 1.48 G/4L; G/4L; G/4L; G/4L
USE AS DIRECTED POWDER, FOR SOLUTION ORAL
Qty: 1 | Refills: 0 | Status: ON HOLD | COMMUNITY
Start: 2019-01-17

## 2023-12-14 RX ORDER — METFORMIN HYDROCHLORIDE 1000 MG/1
TAKE 1 TABLET EVERY 12 HOURS DAILY TABLET, COATED ORAL
Refills: 0 | Status: ACTIVE | COMMUNITY
Start: 2018-08-01

## 2023-12-14 RX ORDER — CYCLOBENZAPRINE HYDROCHLORIDE 10 MG/1
TAKE 1 TABLET BY MOUTH THREE TIMES A DAY AS NEEDED FOR MUSCLE SPASMS TABLET, FILM COATED ORAL
Qty: 30 | Refills: 0 | Status: ON HOLD | COMMUNITY
Start: 2018-10-16

## 2023-12-14 RX ORDER — DULOXETINE 60 MG/1
TAKE 1 CAPSULE DAILY CAPSULE, DELAYED RELEASE PELLETS ORAL
Refills: 0 | Status: ON HOLD | COMMUNITY
Start: 2018-04-04

## 2023-12-14 RX ORDER — POTASSIUM CHLORIDE 10 MEQ/1
1 CAPSULE WITH FOOD CAPSULE, COATED, EXTENDED RELEASE ORAL TWICE A DAY
Status: ACTIVE | COMMUNITY

## 2023-12-14 RX ORDER — LISDEXAMFETAMINE DIMESYLATE 70 MG/1
TAKE 1 CAPSULE DAILY IN THE MORNING CAPSULE ORAL
Refills: 0 | Status: ON HOLD | COMMUNITY
Start: 2018-03-08

## 2023-12-14 RX ORDER — ESCITALOPRAM 20 MG/1
TABLET, FILM COATED ORAL
Qty: 90 | Refills: 0 | Status: ON HOLD | COMMUNITY
Start: 2013-03-14

## 2023-12-14 RX ORDER — BUPROPION HYDROCHLORIDE 300 MG/1
TABLET, EXTENDED RELEASE ORAL
Qty: 30 | Refills: 0 | Status: ON HOLD | COMMUNITY
Start: 2018-09-17

## 2023-12-14 RX ORDER — IBUPROFEN 800 MG/1
TAKE 1 TABLET BY MOUTH THREE TIMES A DAY TABLET ORAL
Qty: 21 | Refills: 0 | Status: ON HOLD | COMMUNITY
Start: 2018-07-27

## 2023-12-14 RX ORDER — CLONIDINE HYDROCHLORIDE 0.3 MG/1
1 TABLET TABLET ORAL ONCE A DAY
Status: ACTIVE | COMMUNITY

## 2023-12-14 RX ORDER — CLINDAMYCIN HYDROCHLORIDE 300 MG/1
TK 1 C PO TID CAPSULE ORAL
Qty: 21 | Refills: 0 | Status: ON HOLD | COMMUNITY
Start: 2018-11-28

## 2023-12-14 RX ORDER — BUPROPION HYDROCHLORIDE 150 MG/1
TABLET, EXTENDED RELEASE ORAL
Qty: 90 | Refills: 0 | Status: ON HOLD | COMMUNITY
Start: 2013-01-29

## 2023-12-14 RX ORDER — HYDROCODONE BITARTRATE AND ACETAMINOPHEN 5; 325 MG/1; MG/1
TAKE 1 TABLET BY MOUTH EVERY 6 HOURS AS NEEDED FOR PAIN TABLET ORAL
Qty: 20 | Refills: 0 | Status: ON HOLD | COMMUNITY
Start: 2018-07-27

## 2023-12-14 RX ORDER — DOCUSATE SODIUM 100 MG/1
TK 1 C PO BID PRN CAPSULE, LIQUID FILLED ORAL
Qty: 20 | Refills: 0 | Status: ON HOLD | COMMUNITY
Start: 2018-11-28

## 2023-12-14 RX ORDER — OMEPRAZOLE 20 MG/1
CAPSULE, DELAYED RELEASE ORAL
Qty: 180 | Refills: 0 | Status: ON HOLD | COMMUNITY
Start: 2012-07-20

## 2023-12-14 RX ORDER — BUPROPION HYDROCHLORIDE 150 MG/1
TK 1 T PO QAM FOR 7 DAYS TABLET, FILM COATED, EXTENDED RELEASE ORAL
Qty: 7 | Refills: 0 | Status: ON HOLD | COMMUNITY
Start: 2018-09-17

## 2023-12-14 RX ORDER — ATOMOXETINE 40 MG/1
1 CAPSULE IN THE MORNING CAPSULE ORAL ONCE A DAY
Status: ACTIVE | COMMUNITY

## 2023-12-14 RX ORDER — HYDROCHLOROTHIAZIDE 25 MG/1
TABLET ORAL
Qty: 90 | Refills: 0 | Status: ACTIVE | COMMUNITY
Start: 2013-01-29

## 2023-12-14 NOTE — HPI-TODAY'S VISIT:
54 yo woman referred for evaluation of GERD.  She has a past medical history of DM with polyneuropathy, HTN, depression, obesity, dysphagia and anorexia.  Presents after EGD. She was seen in the office in January 2019 for evaluation of anemia, as well as esophageal dysphagia. She was recommended a colonoscopy with consideration of EGD. Workup was not completed.  Barium swallow 6/7/23: normal  Overall he is doing reasonably well.  Her dysphagia is better.  She denies any heartburn on omeprazole.  There is been no abdominal pain.  She denies any nausea or vomiting.  She continues to lose weight.  She has 1-2 bowel moods per day sometimes they can leak out with the MiraLAX.  There is been no blood or melena.  Her stools are greenish on iron therapy.  Cologuard test in early July was negative. Blood work on 9/20/2023 revealed a hemoglobin 11.2, WBC is 6.4 and platelet count 388,000.  Iron saturation is 13% with a ferritin of 301, B12 is 369 and folate is 5.8.  Sodium 139 potassium 3.7 BUN 14 creatinine 1.1.  AST is less than 14, ALT 9, alk phos a 74, total bili 0.4.  Albumin is 4.4.  TSH is 0.966 and free T4 is 1.25.  CRP is 1.3.

## 2023-12-14 NOTE — HPI-OTHER HISTORIES
CT scan of abdomen pelvis with contrast on 10/30/2023 revealed breast implant with adjacent fluid on the left breast.  There is liver steatosis and hepatomegaly.  The pancreas is normal.  There is a large volume of stool with some wall thickening in the sigmoid colon that is decompressed.  EGD on 10/6/2023 revealed a normal esophagus and Z-line at 38 cm.  A 40 English Leslie dilation was empirically done.  There is no change.  After dilation and esophageal biopsies were revealed no significant pathologic abnormality.  There is no eosinophils.  There was some erythema of the gastric antrum and body biopsies revealed no significant abnormality negative for H. pylori.  The duodenum was normal.EGD on 9/1/2023 revealed a normal esophagus.  There was a large amount of food in the stomach the exam was aborted.

## 2024-01-04 ENCOUNTER — OFFICE VISIT (OUTPATIENT)
Dept: URBAN - METROPOLITAN AREA CLINIC 113 | Facility: CLINIC | Age: 54
End: 2024-01-04

## 2024-01-05 ENCOUNTER — OFFICE VISIT (OUTPATIENT)
Dept: URBAN - METROPOLITAN AREA MEDICAL CENTER 2 | Facility: MEDICAL CENTER | Age: 54
End: 2024-01-05

## 2024-01-12 ENCOUNTER — OFFICE VISIT (OUTPATIENT)
Dept: URBAN - METROPOLITAN AREA SURGERY CENTER 25 | Facility: SURGERY CENTER | Age: 54
End: 2024-01-12

## 2024-02-16 ENCOUNTER — COLON (OUTPATIENT)
Dept: URBAN - METROPOLITAN AREA MEDICAL CENTER 2 | Facility: MEDICAL CENTER | Age: 54
End: 2024-02-16

## 2024-02-19 ENCOUNTER — OV EP (OUTPATIENT)
Dept: URBAN - METROPOLITAN AREA CLINIC 113 | Facility: CLINIC | Age: 54
End: 2024-02-19

## 2024-02-27 ENCOUNTER — LAB (OUTPATIENT)
Dept: URBAN - METROPOLITAN AREA CLINIC 113 | Facility: CLINIC | Age: 54
End: 2024-02-27

## 2024-02-27 ENCOUNTER — OV EP (OUTPATIENT)
Dept: URBAN - METROPOLITAN AREA CLINIC 113 | Facility: CLINIC | Age: 54
End: 2024-02-27
Payer: COMMERCIAL

## 2024-02-27 VITALS
DIASTOLIC BLOOD PRESSURE: 77 MMHG | TEMPERATURE: 97.1 F | HEART RATE: 107 BPM | SYSTOLIC BLOOD PRESSURE: 108 MMHG | RESPIRATION RATE: 12 BRPM | BODY MASS INDEX: 32.89 KG/M2 | WEIGHT: 217 LBS | HEIGHT: 68 IN

## 2024-02-27 DIAGNOSIS — R13.19 ESOPHAGEAL DYSPHAGIA: ICD-10-CM

## 2024-02-27 DIAGNOSIS — R63.0 ANOREXIA: ICD-10-CM

## 2024-02-27 DIAGNOSIS — R63.4 WEIGHT LOSS: ICD-10-CM

## 2024-02-27 DIAGNOSIS — K59.01 CONSTIPATION BY DELAYED COLONIC TRANSIT: ICD-10-CM

## 2024-02-27 PROCEDURE — 99213 OFFICE O/P EST LOW 20 MIN: CPT | Performed by: INTERNAL MEDICINE

## 2024-02-27 RX ORDER — METFORMIN HYDROCHLORIDE 500 MG/1
TABLET, COATED ORAL
Qty: 120 | Refills: 0 | Status: ON HOLD | COMMUNITY
Start: 2019-01-07

## 2024-02-27 RX ORDER — ATOMOXETINE 40 MG/1
1 CAPSULE IN THE MORNING CAPSULE ORAL ONCE A DAY
Status: ACTIVE | COMMUNITY

## 2024-02-27 RX ORDER — GABAPENTIN 300 MG/1
TAKE 2 CAPSULES BY MOUTH EVERY MORNING AND 2 CAPSULES EVERY NIGHT AT B CAPSULE ORAL
Qty: 120 | Refills: 0 | Status: ON HOLD | COMMUNITY
Start: 2018-04-09

## 2024-02-27 RX ORDER — FLUCONAZOLE 150 MG/1
TABLET ORAL
Qty: 2 | Refills: 0 | Status: ON HOLD | COMMUNITY
Start: 2013-08-29

## 2024-02-27 RX ORDER — LISDEXAMFETAMINE DIMESYLATE 70 MG/1
TAKE 1 CAPSULE DAILY IN THE MORNING CAPSULE ORAL
Refills: 0 | Status: ON HOLD | COMMUNITY
Start: 2018-03-08

## 2024-02-27 RX ORDER — ONDANSETRON HYDROCHLORIDE 4 MG/1
TK 1 T PO EVERY 8 HOURS PRN FOR NAUSEA TABLET, FILM COATED ORAL
Qty: 20 | Refills: 0 | Status: ON HOLD | COMMUNITY
Start: 2018-11-28

## 2024-02-27 RX ORDER — OXYCODONE AND ACETAMINOPHEN 5; 325 MG/1; MG/1
TK 1 TS PO Q 6 H PRN P TABLET ORAL
Qty: 30 | Refills: 0 | Status: ON HOLD | COMMUNITY
Start: 2018-11-28

## 2024-02-27 RX ORDER — CYCLOBENZAPRINE HYDROCHLORIDE 10 MG/1
TAKE 1 TABLET BY MOUTH THREE TIMES A DAY AS NEEDED FOR MUSCLE SPASMS TABLET, FILM COATED ORAL
Qty: 30 | Refills: 0 | Status: ON HOLD | COMMUNITY
Start: 2018-10-16

## 2024-02-27 RX ORDER — BENZONATATE 200 MG/1
CAPSULE ORAL
Qty: 30 | Refills: 0 | Status: ON HOLD | COMMUNITY
Start: 2013-10-16

## 2024-02-27 RX ORDER — PREDNISONE 20 MG/1
TAKE 3 TABLETS BY MOUTH EVERY DAY FOR 5 DAYS TABLET ORAL
Qty: 15 | Refills: 0 | Status: ON HOLD | COMMUNITY
Start: 2018-07-27

## 2024-02-27 RX ORDER — DULOXETINE 60 MG/1
TAKE 1 CAPSULE DAILY CAPSULE, DELAYED RELEASE PELLETS ORAL
Refills: 0 | Status: ON HOLD | COMMUNITY
Start: 2018-04-04

## 2024-02-27 RX ORDER — LAMOTRIGINE 100 MG/1
TAKE 1 TABLET DAILY TABLET ORAL
Refills: 0 | Status: ON HOLD | COMMUNITY
Start: 2018-03-08

## 2024-02-27 RX ORDER — OMEPRAZOLE 20 MG/1
CAPSULE, DELAYED RELEASE ORAL
Qty: 180 | Refills: 0 | Status: ON HOLD | COMMUNITY
Start: 2012-07-20

## 2024-02-27 RX ORDER — METFORMIN HYDROCHLORIDE 500 MG/1
TABLET, EXTENDED RELEASE ORAL
Qty: 255 | Refills: 0 | Status: ON HOLD | COMMUNITY
Start: 2012-07-20

## 2024-02-27 RX ORDER — CYCLOBENZAPRINE HYDROCHLORIDE 5 MG/1
TAKE 1 TABLET BY MOUTH TWICE DAILY, AS NEEDED TABLET, FILM COATED ORAL
Qty: 28 | Refills: 0 | Status: ON HOLD | COMMUNITY
Start: 2018-07-23

## 2024-02-27 RX ORDER — NYSTATIN 100000 [USP'U]/ML
SWISH AND SWALLOW 10 TO 15 ML PO QID PRN SUSPENSION ORAL
Qty: 450 | Refills: 0 | Status: ON HOLD | COMMUNITY
Start: 2018-01-29

## 2024-02-27 RX ORDER — POLYETHYLENE GLYCOL 3350, SODIUM CHLORIDE, SODIUM BICARBONATE AND POTASSIUM CHLORIDE WITH LEMON FLAVOR 420; 11.2; 5.72; 1.48 G/4L; G/4L; G/4L; G/4L
USE AS DIRECTED POWDER, FOR SOLUTION ORAL
Qty: 1 | Refills: 0 | Status: ON HOLD | COMMUNITY
Start: 2019-01-17

## 2024-02-27 RX ORDER — POLYETHYLENE GLYCOL 3350, SODIUM CHLORIDE, SODIUM BICARBONATE, POTASSIUM CHLORIDE 420; 11.2; 5.72; 1.48 G/4L; G/4L; G/4L; G/4L
AS DIRECTED POWDER, FOR SOLUTION ORAL ONCE
Qty: 420 GM | Refills: 0 | OUTPATIENT
Start: 2024-02-27 | End: 2024-02-28

## 2024-02-27 RX ORDER — LORAZEPAM 1 MG/1
TABLET ORAL
Qty: 30 | Refills: 0 | Status: ON HOLD | COMMUNITY
Start: 2018-09-17

## 2024-02-27 RX ORDER — BUPROPION HYDROCHLORIDE 150 MG/1
TABLET, EXTENDED RELEASE ORAL
Qty: 90 | Refills: 0 | Status: ON HOLD | COMMUNITY
Start: 2013-01-29

## 2024-02-27 RX ORDER — DOCUSATE SODIUM 100 MG/1
TK 1 C PO BID PRN CAPSULE, LIQUID FILLED ORAL
Qty: 20 | Refills: 0 | Status: ON HOLD | COMMUNITY
Start: 2018-11-28

## 2024-02-27 RX ORDER — VENLAFAXINE HYDROCHLORIDE 75 MG/1
CAPSULE, EXTENDED RELEASE ORAL
Qty: 30 | Refills: 0 | Status: ON HOLD | COMMUNITY
Start: 2013-11-14

## 2024-02-27 RX ORDER — HYDROCODONE BITARTRATE AND ACETAMINOPHEN 5; 325 MG/1; MG/1
TAKE 1 TABLET BY MOUTH EVERY 6 HOURS AS NEEDED FOR PAIN TABLET ORAL
Qty: 20 | Refills: 0 | Status: ON HOLD | COMMUNITY
Start: 2018-07-27

## 2024-02-27 RX ORDER — CLONIDINE HYDROCHLORIDE 0.3 MG/1
1 TABLET TABLET ORAL ONCE A DAY
Status: ACTIVE | COMMUNITY

## 2024-02-27 RX ORDER — SERTRALINE 50 MG/1
1 TABLET TABLET, FILM COATED ORAL ONCE A DAY
Status: ON HOLD | COMMUNITY

## 2024-02-27 RX ORDER — VENLAFAXINE HYDROCHLORIDE 37.5 MG/1
CAPSULE, EXTENDED RELEASE ORAL
Qty: 30 | Refills: 0 | Status: ON HOLD | COMMUNITY
Start: 2013-11-01

## 2024-02-27 RX ORDER — IBUPROFEN 800 MG/1
TAKE 1 TABLET BY MOUTH THREE TIMES A DAY TABLET ORAL
Qty: 21 | Refills: 0 | Status: ON HOLD | COMMUNITY
Start: 2018-07-27

## 2024-02-27 RX ORDER — ESCITALOPRAM 20 MG/1
TABLET, FILM COATED ORAL
Qty: 90 | Refills: 0 | Status: ON HOLD | COMMUNITY
Start: 2013-03-14

## 2024-02-27 RX ORDER — CLINDAMYCIN HYDROCHLORIDE 300 MG/1
TK 1 C PO TID CAPSULE ORAL
Qty: 21 | Refills: 0 | Status: ON HOLD | COMMUNITY
Start: 2018-11-28

## 2024-02-27 RX ORDER — METFORMIN HYDROCHLORIDE 1000 MG/1
TAKE 1 TABLET EVERY 12 HOURS DAILY TABLET, COATED ORAL
Refills: 0 | Status: ON HOLD | COMMUNITY
Start: 2018-08-01

## 2024-02-27 RX ORDER — BUPROPION HYDROCHLORIDE 300 MG/1
TABLET, EXTENDED RELEASE ORAL
Qty: 30 | Refills: 0 | Status: ON HOLD | COMMUNITY
Start: 2018-09-17

## 2024-02-27 RX ORDER — HYDROCHLOROTHIAZIDE 25 MG/1
TABLET ORAL
Qty: 90 | Refills: 0 | Status: ON HOLD | COMMUNITY
Start: 2013-01-29

## 2024-02-27 RX ORDER — BUPROPION HYDROCHLORIDE 150 MG/1
TK 1 T PO QAM FOR 7 DAYS TABLET, FILM COATED, EXTENDED RELEASE ORAL
Qty: 7 | Refills: 0 | Status: ON HOLD | COMMUNITY
Start: 2018-09-17

## 2024-02-27 RX ORDER — AZITHROMYCIN DIHYDRATE 250 MG/1
TAKE 2 TABLETS BY MOUTH TODAY, THEN TAKE 1 TABLET DAILY FOR 4 DAYS TABLET, FILM COATED ORAL
Qty: 6 | Refills: 0 | Status: ON HOLD | COMMUNITY
Start: 2018-06-25

## 2024-02-27 RX ORDER — CEFUROXIME AXETIL 500 MG/1
TABLET, FILM COATED ORAL
Qty: 20 | Refills: 0 | Status: ON HOLD | COMMUNITY
Start: 2013-10-16

## 2024-02-27 RX ORDER — OMEPRAZOLE 40 MG/1
TAKE 1 TABLET DAILY CAPSULE, DELAYED RELEASE ORAL
Qty: 30 | Refills: 11 | Status: ACTIVE | COMMUNITY
Start: 2018-06-22

## 2024-02-27 RX ORDER — POTASSIUM CHLORIDE 10 MEQ/1
1 CAPSULE WITH FOOD CAPSULE, COATED, EXTENDED RELEASE ORAL TWICE A DAY
Status: ON HOLD | COMMUNITY

## 2024-02-27 NOTE — HPI-OTHER HISTORIES
CT scan of abdomen pelvis with contrast on 10/30/2023 revealed breast implant with adjacent fluid on the left breast.  There is liver steatosis and hepatomegaly.  The pancreas is normal.  There is a large volume of stool with some wall thickening in the sigmoid colon that is decompressed.  EGD on 10/6/2023 revealed a normal esophagus and Z-line at 38 cm.  A 40 [place] Zeal[place] Leslie dilation was empirically done.  There is no change.  After dilation and esophageal biopsies were revealed no significant pathologic abnormality.  There is no eosinophils.  There was some erythema of the gastric antrum and body biopsies revealed no significant abnormality negative for H. pylori.  The duodenum was normal.  EGD on 9/1/2023 revealed a normal esophagus.  There was a large amount of food in the stomach the exam was aborted.

## 2024-02-27 NOTE — HPI-TODAY'S VISIT:
52 yo woman referred for evaluation of GERD.  She has a past medical history of DM with polyneuropathy, HTN, depression, obesity, dysphagia and anorexia.  Presents after EGD. She was seen in the office in January 2019 for evaluation of anemia, as well as esophageal dysphagia. She was recommended a colonoscopy with consideration of EGD. Workup was not completed.  Barium swallow 6/7/23: normal  Overall she is doing reasonably well.  Her dysphagia is better.  She denies any heartburn on omeprazole.  There is been no abdominal pain.  She denies any nausea or vomiting.  She continues to lose weight.  She has 1-2 bowel moods per day sometimes they can leak out with the MiraLAX.  There is been no blood or melena.  Her stools are greenish on iron therapy.  Cologuard test in early July was negative.   She has canceled 3 different colonoscopy appointments.  She currently has no heartburn or dysphagia or abdominal pain.  There is no fevers or chills.  She has lost 1 pound since her last visit.  She has a bowel movement every 2 to 3 days taking MiraLAX every day.  The stools are dark on iron but there is been no bright red blood.  Blood work on 1/4/2024 revealed sodium 132, potassium 3.7 BUN 12 creatinine 1.2.  AST less than 14, ALT 9, alkaline phosphatase 83, total bili 0.4.  Albumin is 4.3, TSH is 1.45, A1c is 5.6.  On 12/26/2023 hemoglobin 11.7, WBC of 5.48 and platelet count 302,000.  Iron saturation is 20.7% ferritin is 358 B12 is 475. Blood work on 9/20/2023 revealed a hemoglobin 11.2, WBC is 6.4 and platelet count 388,000.  Iron saturation is 13% with a ferritin of 301, B12 is 369 and folate is 5.8.  Sodium 139 potassium 3.7 BUN 14 creatinine 1.1.  AST is less than 14, ALT 9, alk phos a 74, total bili 0.4.  Albumin is 4.4.  TSH is 0.966 and free T4 is 1.25.  CRP is 1.3.

## 2024-03-15 ENCOUNTER — COLON (OUTPATIENT)
Dept: URBAN - METROPOLITAN AREA MEDICAL CENTER 2 | Facility: MEDICAL CENTER | Age: 54
End: 2024-03-15
Payer: COMMERCIAL

## 2024-03-15 DIAGNOSIS — K63.89 APPENDICITIS EPIPLOICA: ICD-10-CM

## 2024-03-15 DIAGNOSIS — K59.09 CHANGE IN BOWEL MOVEMENTS INTERMITTENT CONSTIPATION. URGENCY IN THE MORNING.: ICD-10-CM

## 2024-03-15 DIAGNOSIS — R63.4 ABNORMAL INTENTIONAL WEIGHT LOSS: ICD-10-CM

## 2024-03-15 DIAGNOSIS — R93.3 ABN FINDINGS-GI TRACT: ICD-10-CM

## 2024-03-15 PROCEDURE — 45385 COLONOSCOPY W/LESION REMOVAL: CPT | Performed by: INTERNAL MEDICINE

## 2024-04-19 ENCOUNTER — OV EP (OUTPATIENT)
Dept: URBAN - METROPOLITAN AREA CLINIC 113 | Facility: CLINIC | Age: 54
End: 2024-04-19
Payer: COMMERCIAL

## 2024-04-19 VITALS
RESPIRATION RATE: 18 BRPM | HEIGHT: 68 IN | BODY MASS INDEX: 32.58 KG/M2 | SYSTOLIC BLOOD PRESSURE: 115 MMHG | TEMPERATURE: 97.7 F | WEIGHT: 215 LBS | DIASTOLIC BLOOD PRESSURE: 85 MMHG | HEART RATE: 92 BPM

## 2024-04-19 DIAGNOSIS — R63.4 WEIGHT LOSS: ICD-10-CM

## 2024-04-19 DIAGNOSIS — R13.19 ESOPHAGEAL DYSPHAGIA: ICD-10-CM

## 2024-04-19 DIAGNOSIS — R63.0 ANOREXIA: ICD-10-CM

## 2024-04-19 DIAGNOSIS — K59.01 CONSTIPATION BY DELAYED COLONIC TRANSIT: ICD-10-CM

## 2024-04-19 PROCEDURE — 99212 OFFICE O/P EST SF 10 MIN: CPT | Performed by: INTERNAL MEDICINE

## 2024-04-19 RX ORDER — DOCUSATE SODIUM 100 MG/1
TK 1 C PO BID PRN CAPSULE, LIQUID FILLED ORAL
Qty: 20 | Refills: 0 | Status: ON HOLD | COMMUNITY
Start: 2018-11-28

## 2024-04-19 RX ORDER — LISDEXAMFETAMINE DIMESYLATE 70 MG/1
TAKE 1 CAPSULE DAILY IN THE MORNING CAPSULE ORAL
Refills: 0 | Status: ON HOLD | COMMUNITY
Start: 2018-03-08

## 2024-04-19 RX ORDER — CYCLOBENZAPRINE HYDROCHLORIDE 5 MG/1
TAKE 1 TABLET BY MOUTH TWICE DAILY, AS NEEDED TABLET, FILM COATED ORAL
Qty: 28 | Refills: 0 | Status: ON HOLD | COMMUNITY
Start: 2018-07-23

## 2024-04-19 RX ORDER — LORAZEPAM 1 MG/1
TABLET ORAL
Qty: 30 | Refills: 0 | Status: ON HOLD | COMMUNITY
Start: 2018-09-17

## 2024-04-19 RX ORDER — HYDROCODONE BITARTRATE AND ACETAMINOPHEN 5; 325 MG/1; MG/1
TAKE 1 TABLET BY MOUTH EVERY 6 HOURS AS NEEDED FOR PAIN TABLET ORAL
Qty: 20 | Refills: 0 | Status: ON HOLD | COMMUNITY
Start: 2018-07-27

## 2024-04-19 RX ORDER — CLONIDINE HYDROCHLORIDE 0.3 MG/1
1 TABLET TABLET ORAL ONCE A DAY
Status: ACTIVE | COMMUNITY

## 2024-04-19 RX ORDER — ATOMOXETINE 40 MG/1
1 CAPSULE IN THE MORNING CAPSULE ORAL ONCE A DAY
Status: ACTIVE | COMMUNITY

## 2024-04-19 RX ORDER — OMEPRAZOLE 20 MG/1
CAPSULE, DELAYED RELEASE ORAL
Qty: 180 | Refills: 0 | Status: ON HOLD | COMMUNITY
Start: 2012-07-20

## 2024-04-19 RX ORDER — HYDROCHLOROTHIAZIDE 25 MG/1
TABLET ORAL
Qty: 90 | Refills: 0 | Status: ACTIVE | COMMUNITY
Start: 2013-01-29

## 2024-04-19 RX ORDER — CYCLOBENZAPRINE HYDROCHLORIDE 10 MG/1
TAKE 1 TABLET BY MOUTH THREE TIMES A DAY AS NEEDED FOR MUSCLE SPASMS TABLET, FILM COATED ORAL
Qty: 30 | Refills: 0 | Status: ON HOLD | COMMUNITY
Start: 2018-10-16

## 2024-04-19 RX ORDER — LAMOTRIGINE 100 MG/1
TAKE 1 TABLET DAILY TABLET ORAL
Refills: 0 | Status: ON HOLD | COMMUNITY
Start: 2018-03-08

## 2024-04-19 RX ORDER — VENLAFAXINE HYDROCHLORIDE 75 MG/1
CAPSULE, EXTENDED RELEASE ORAL
Qty: 30 | Refills: 0 | Status: ON HOLD | COMMUNITY
Start: 2013-11-14

## 2024-04-19 RX ORDER — CLINDAMYCIN HYDROCHLORIDE 300 MG/1
TK 1 C PO TID CAPSULE ORAL
Qty: 21 | Refills: 0 | Status: ON HOLD | COMMUNITY
Start: 2018-11-28

## 2024-04-19 RX ORDER — DULOXETINE 60 MG/1
TAKE 1 CAPSULE DAILY CAPSULE, DELAYED RELEASE PELLETS ORAL
Refills: 0 | Status: ON HOLD | COMMUNITY
Start: 2018-04-04

## 2024-04-19 RX ORDER — SERTRALINE 50 MG/1
1 TABLET TABLET, FILM COATED ORAL ONCE A DAY
Status: ON HOLD | COMMUNITY

## 2024-04-19 RX ORDER — POLYETHYLENE GLYCOL 3350, SODIUM CHLORIDE, SODIUM BICARBONATE AND POTASSIUM CHLORIDE WITH LEMON FLAVOR 420; 11.2; 5.72; 1.48 G/4L; G/4L; G/4L; G/4L
USE AS DIRECTED POWDER, FOR SOLUTION ORAL
Qty: 1 | Refills: 0 | Status: ON HOLD | COMMUNITY
Start: 2019-01-17

## 2024-04-19 RX ORDER — BUPROPION HYDROCHLORIDE 300 MG/1
TABLET, EXTENDED RELEASE ORAL
Qty: 30 | Refills: 0 | Status: ON HOLD | COMMUNITY
Start: 2018-09-17

## 2024-04-19 RX ORDER — BENZONATATE 200 MG/1
CAPSULE ORAL
Qty: 30 | Refills: 0 | Status: ON HOLD | COMMUNITY
Start: 2013-10-16

## 2024-04-19 RX ORDER — METFORMIN HYDROCHLORIDE 1000 MG/1
TAKE 1 TABLET EVERY 12 HOURS DAILY TABLET, COATED ORAL
Refills: 0 | Status: ON HOLD | COMMUNITY
Start: 2018-08-01

## 2024-04-19 RX ORDER — BUPROPION HYDROCHLORIDE 150 MG/1
TABLET, EXTENDED RELEASE ORAL
Qty: 90 | Refills: 0 | Status: ON HOLD | COMMUNITY
Start: 2013-01-29

## 2024-04-19 RX ORDER — AZITHROMYCIN DIHYDRATE 250 MG/1
TAKE 2 TABLETS BY MOUTH TODAY, THEN TAKE 1 TABLET DAILY FOR 4 DAYS TABLET, FILM COATED ORAL
Qty: 6 | Refills: 0 | Status: ON HOLD | COMMUNITY
Start: 2018-06-25

## 2024-04-19 RX ORDER — METFORMIN HYDROCHLORIDE 500 MG/1
TABLET, COATED ORAL
Qty: 120 | Refills: 0 | Status: ON HOLD | COMMUNITY
Start: 2019-01-07

## 2024-04-19 RX ORDER — VENLAFAXINE HYDROCHLORIDE 37.5 MG/1
CAPSULE, EXTENDED RELEASE ORAL
Qty: 30 | Refills: 0 | Status: ON HOLD | COMMUNITY
Start: 2013-11-01

## 2024-04-19 RX ORDER — BUPROPION HYDROCHLORIDE 150 MG/1
TK 1 T PO QAM FOR 7 DAYS TABLET, FILM COATED, EXTENDED RELEASE ORAL
Qty: 7 | Refills: 0 | Status: ON HOLD | COMMUNITY
Start: 2018-09-17

## 2024-04-19 RX ORDER — POTASSIUM CHLORIDE 10 MEQ/1
1 CAPSULE WITH FOOD CAPSULE, COATED, EXTENDED RELEASE ORAL TWICE A DAY
Status: ON HOLD | COMMUNITY

## 2024-04-19 RX ORDER — FLUCONAZOLE 150 MG/1
TABLET ORAL
Qty: 2 | Refills: 0 | Status: ON HOLD | COMMUNITY
Start: 2013-08-29

## 2024-04-19 RX ORDER — IBUPROFEN 800 MG/1
TAKE 1 TABLET BY MOUTH THREE TIMES A DAY TABLET ORAL
Qty: 21 | Refills: 0 | Status: ON HOLD | COMMUNITY
Start: 2018-07-27

## 2024-04-19 RX ORDER — NYSTATIN 100000 [USP'U]/ML
SWISH AND SWALLOW 10 TO 15 ML PO QID PRN SUSPENSION ORAL
Qty: 450 | Refills: 0 | Status: ON HOLD | COMMUNITY
Start: 2018-01-29

## 2024-04-19 RX ORDER — ESCITALOPRAM 20 MG/1
TABLET, FILM COATED ORAL
Qty: 90 | Refills: 0 | Status: ON HOLD | COMMUNITY
Start: 2013-03-14

## 2024-04-19 RX ORDER — METFORMIN HYDROCHLORIDE 500 MG/1
TABLET, EXTENDED RELEASE ORAL
Qty: 255 | Refills: 0 | Status: ON HOLD | COMMUNITY
Start: 2012-07-20

## 2024-04-19 RX ORDER — OXYCODONE AND ACETAMINOPHEN 5; 325 MG/1; MG/1
TK 1 TS PO Q 6 H PRN P TABLET ORAL
Qty: 30 | Refills: 0 | Status: ON HOLD | COMMUNITY
Start: 2018-11-28

## 2024-04-19 RX ORDER — PREDNISONE 20 MG/1
TAKE 3 TABLETS BY MOUTH EVERY DAY FOR 5 DAYS TABLET ORAL
Qty: 15 | Refills: 0 | Status: ON HOLD | COMMUNITY
Start: 2018-07-27

## 2024-04-19 RX ORDER — ONDANSETRON HYDROCHLORIDE 4 MG/1
TK 1 T PO EVERY 8 HOURS PRN FOR NAUSEA TABLET, FILM COATED ORAL
Qty: 20 | Refills: 0 | Status: ON HOLD | COMMUNITY
Start: 2018-11-28

## 2024-04-19 RX ORDER — CEFUROXIME AXETIL 500 MG/1
TABLET, FILM COATED ORAL
Qty: 20 | Refills: 0 | Status: ON HOLD | COMMUNITY
Start: 2013-10-16

## 2024-04-19 RX ORDER — GABAPENTIN 300 MG/1
TAKE 2 CAPSULES BY MOUTH EVERY MORNING AND 2 CAPSULES EVERY NIGHT AT B CAPSULE ORAL
Qty: 120 | Refills: 0 | Status: ON HOLD | COMMUNITY
Start: 2018-04-09

## 2024-04-19 RX ORDER — OMEPRAZOLE 40 MG/1
TAKE 1 TABLET DAILY CAPSULE, DELAYED RELEASE ORAL
Qty: 30 | Refills: 11 | Status: ACTIVE | COMMUNITY
Start: 2018-06-22

## 2024-04-19 NOTE — HPI-OTHER HISTORIES
Colonoscopy on 3/15/2024 revealed a normal terminal ileum, 3 mm polyp in the transverse colon pathology revealed benign colonic mucosa.  CT scan of abdomen pelvis with contrast on 10/30/2023 revealed breast implant with adjacent fluid on the left breast.  There is liver steatosis and hepatomegaly.  The pancreas is normal.  There is a large volume of stool with some wall thickening in the sigmoid colon that is decompressed.  EGD on 10/6/2023 revealed a normal esophagus and Z-line at 38 cm.  A 40 Faroese Leslie dilation was empirically done.  There is no change.  After dilation and esophageal biopsies were revealed no significant pathologic abnormality.  There is no eosinophils.  There was some erythema of the gastric antrum and body biopsies revealed no significant abnormality negative for H. pylori.  The duodenum was normal.  EGD on 9/1/2023 revealed a normal esophagus.  There was a large amount of food in the stomach the exam was aborted.

## 2024-04-19 NOTE — HPI-TODAY'S VISIT:
54 yo woman referred for evaluation of GERD.  She has a past medical history of DM with polyneuropathy, HTN, depression, obesity, dysphagia and anorexia for follow-up after colonoscopy. She was seen in the office in January 2019 for evaluation of anemia, as well as esophageal dysphagia.  Barium swallow 6/7/23: normal  She is doing quite well she states she has constipation.  She will use Senokot if needed.  She has a bowel movement every 2 to 3 days.  There is been no blood or melena.  MiraLAX made her leak stools so she did not want to use that.  She has had no nausea or vomiting or abdominal pain.  She denies any heartburn on her medication.  Dysphagia is not bothering her currently she states.  She did see the breast center at [facility] and they referred her to a plastic surgeon Dr. Keating, to remove ruptured breast implant.  MRI of breasts bilateral on 3/27/2024 reveals no MRI evidence of breast malignancy.  Left breast suggests breast implant rupture.  Blood work on 1/4/2024 revealed sodium 132, potassium 3.7 BUN 12 creatinine 1.2.  AST less than 14, ALT 9, alkaline phosphatase 83, total bili 0.4.  Albumin is 4.3, TSH is 1.45, A1c is 5.6.  On 12/26/2023 hemoglobin 11.7, WBC of 5.48 and platelet count 302,000.  Iron saturation is 20.7% ferritin is 358 B12 is 475. Blood work on 9/20/2023 revealed a hemoglobin 11.2, WBC is 6.4 and platelet count 388,000.  Iron saturation is 13% with a ferritin of 301, B12 is 369 and folate is 5.8.  Sodium 139 potassium 3.7 BUN 14 creatinine 1.1.  AST is less than 14, ALT 9, alk phos a 74, total bili 0.4.  Albumin is 4.4.  TSH is 0.966 and free T4 is 1.25.  CRP is 1.3.